# Patient Record
Sex: FEMALE | Race: BLACK OR AFRICAN AMERICAN | Employment: FULL TIME | URBAN - METROPOLITAN AREA
[De-identification: names, ages, dates, MRNs, and addresses within clinical notes are randomized per-mention and may not be internally consistent; named-entity substitution may affect disease eponyms.]

---

## 2022-04-15 ENCOUNTER — HOSPITAL ENCOUNTER (OUTPATIENT)
Dept: NUTRITION | Age: 37
Discharge: HOME | End: 2022-04-15
Payer: SELF-PAY

## 2022-04-15 PROCEDURE — 97802 MEDICAL NUTRITION INDIV IN: CPT

## 2022-04-15 NOTE — PROGRESS NOTES
Shaan Sargent, MS, RD, LD  Outpatient Registered Dietitian  [facility]  Phone: [phone]  Fax: [phone]    ASSESSMENT  Pt is a 43 y.o. female referred for dietary counseling and education with the following diagnosis (es):  Morbid (severe) obesity due to excess calories [E66.01]  Body mass index (BMI) 50.0-59.9, adult [Z68.43]     Hx: recurrent kidney stones. Comes in today requesting support weight reduction. Concerned that she may have thyroid dysfunction reporting fatigue. Avoiding spinach because of high oxylate. Lives with partner, 2 children, and a cousin. Works 1 full time job in healthcare, and a part-time job in [employer] on weekends. Exercises 5 days a week for 30 minutes. Skips breakfast daily, shares responsibilities with cooking family. Using my fitness pal to track calories and food intake. 3 day food recall: protein adequate, 0 servings of fruits/vegetables, inadequate in nutrient density, higher in total/saturated fats from fried foods, and snacks and sodium, inadequate in fiber. Voices it has been recommended that she drink 2L water daily since kidney stones. Weight hx:bigger body since childhood, states that she feels comfortable at 250 pounds, her goal weight at this time is 300 pounds, presently 340 pounds. Notices weight fluctuations since discontinuing HTZ \"6 pounds\". Factors identified today that impact current eating habits include meal planning, food preferences, social support - family dislikes healthier options, shares responsibility for meal preparation, and food purchasing.      No results found for: HBA1C, XAM6VOSJ, GLU, GESTF, GLUCPOC, MCACR, MCA1, MCA2, MCA3, MCAU, LDL, LDLC, DLDLP, DEENA, CREAPOC, ACREA, CREA, REFC3, REFC4, [id number]   No results found for: CHOL, CHOLPOCT, HDL, LDL, LDLC, LDLCPOC, LDLCEXT, TRIGL, TGLPOCT, CHHD, CHHDX  No results found for: ALTPOC, ALT, ASTPOC, GGT, AP, APIT, APX, CBIL, TBIL, TBILI, ALB, ALBPOC, TP, NH3, NH4, INR, INREXT, PTP, PTINR, PTEXT, PLT, PLTPOC, HCABQL, HBSAG, AFP, INREXT, PTEXT, PLTEXT    Meds: antihypertensive- HTZ- d/c after kidney stones    Barriers to change: nutrition knowledge deficit, established food preferences/eating behaviors, current nutrition attitudes/beliefs, and lacks applicable skills- cooking, meal planning, time management, etc.    Social/ Support: partner, 2 children, a 3rd child is coming to live with her, and her cousin. Diet Recall:   Eating pattern appears low in heart healthy fats and inadequate in fiber, high in added sugar, total carbohydrate and saturated fat. Physical Activity: 30 mins/d, 5 days/wk    Sleep Habits: unknown    Behaviors indicate current stage of change is: Preparation. Anthropometrics: There is no height or weight on file to calculate BMI. Self reported weight/ht: 154.5kg/154.94cm     Estimated Nutrition Needs:  MSJ x 1.2= 2600 (200-500 for weight reduction) = 2200 kcal/day   Protein:110 g/day (20%)    Carbohydrate: 220g/day (40%)    Fat: 90g/day (35-40%)     DIAGNOSIS:  Unbalanced diet related to nutrition related knowledge deficit and disordered eating behaviors as evidenced by pt request for RD education and counseling, weight cycling, and diet history. INTERVENTIONS:  60 minute appointment    Nutrition Prescription:  Modification of Meals and Snacks (RD Goals for pt):    Increase servings of nonstarchy vegetables from baseline  Replace saturated fat sources with unsaturated fat (nuts, seeds, avocado, olive oil), dislikes avocados/olives  Increase fiber intake to minimum of 25g/day (whole grains, fruit, vegetables)-- dislikes beans,  Daily- weekly consumption of dairy, poultry, eggs  Consume fish 2 or more times weekly  Limit refined carbohydrates, added sugar, sugar sweetened beverages, processed meats, and fried foods to 2x per week or less  Water as primary beverage    Nutrition Education and Counseling  Utilized motivational interviewing, problem solving, self monitoring, cognitive restructuring, and goal setting in behavior change counseling  Reviewed MNT for weight management. Specifically emphasized the importance of 3 structured meals daily. Discussed quick wins. With family an influence on meals, discussed focusing on breakfast and lunch meals first.   Provided recommended portions of carbohydrate, protein, and fat as guide for meals and snacks. Discussed recommendations in the context of [employer]- and the health benefits of the macronutrient. Reviewed energy dense vs. Nutrient dense calories. Set goal to have breakfast daily   Discussed kidney stone MNT- reducing sodium intake, increasing water intake to start. Materials Provided and Discussed:  Meal tracking tool  Macronutrient needs- along with fiber recommendations    Utilized the following behavior change strategies:   Pt verbalized understanding of recommendations discussed. Anticipate fair compliance with recommendations. Goal: Increase nutrition density/quality of eating pattern, Weight loss of 1-2#/wk  Action Steps:           1. Track meals for 7 days          2. Eat 3 balanced meals daily- starting with breakfast daily. 3. Increase non starchy vegetables, and fiber from baseline. 1-2 servings daily.      MONITORING & EVALUATION:  Monitor food/nutrient intake and weight  Follow Up: 3 weeks with tracked meals (30 min)